# Patient Record
Sex: FEMALE | Race: WHITE | Employment: STUDENT | URBAN - METROPOLITAN AREA
[De-identification: names, ages, dates, MRNs, and addresses within clinical notes are randomized per-mention and may not be internally consistent; named-entity substitution may affect disease eponyms.]

---

## 2017-01-05 ENCOUNTER — HOSPITAL ENCOUNTER (OUTPATIENT)
Dept: LAB | Age: 22
Discharge: HOME | End: 2017-01-05
Payer: COMMERCIAL

## 2017-01-05 PROCEDURE — 88175 CYTOPATH C/V AUTO FLUID REDO: CPT | Performed by: OBSTETRICS & GYNECOLOGY

## 2018-04-18 PROBLEM — Z30.09 CONTRACEPTIVE EDUCATION: Status: ACTIVE | Noted: 2018-04-18

## 2018-04-19 PROBLEM — Z11.3 SCREEN FOR STD (SEXUALLY TRANSMITTED DISEASE): Status: ACTIVE | Noted: 2018-04-19

## 2018-04-19 PROBLEM — Z30.017 NEXPLANON INSERTION: Status: ACTIVE | Noted: 2018-04-19

## 2018-04-19 PROBLEM — Z30.09 CONTRACEPTIVE EDUCATION: Status: RESOLVED | Noted: 2018-04-18 | Resolved: 2018-04-19

## 2018-04-19 PROBLEM — Z30.46 NEXPLANON REMOVAL: Status: ACTIVE | Noted: 2018-04-19

## 2018-08-13 PROBLEM — N64.4 BREAST PAIN: Status: ACTIVE | Noted: 2018-08-13

## 2018-10-12 PROBLEM — Z30.46 NEXPLANON REMOVAL: Status: RESOLVED | Noted: 2018-04-19 | Resolved: 2018-10-12

## 2018-10-12 PROBLEM — Z30.017 NEXPLANON INSERTION: Status: RESOLVED | Noted: 2018-04-19 | Resolved: 2018-10-12

## 2018-10-12 PROBLEM — N92.1 MENORRHAGIA WITH IRREGULAR CYCLE: Status: ACTIVE | Noted: 2018-10-12

## 2019-06-06 PROBLEM — Z01.419 ENCOUNTER FOR ANNUAL ROUTINE GYNECOLOGICAL EXAMINATION: Status: ACTIVE | Noted: 2019-06-06

## 2019-06-06 PROBLEM — Z11.3 SCREEN FOR STD (SEXUALLY TRANSMITTED DISEASE): Status: RESOLVED | Noted: 2018-04-19 | Resolved: 2019-06-06

## 2020-09-21 PROBLEM — N64.4 BREAST PAIN: Status: RESOLVED | Noted: 2018-08-13 | Resolved: 2020-09-21

## 2021-05-03 PROBLEM — Z30.430 ENCOUNTER FOR IUD INSERTION: Status: ACTIVE | Noted: 2021-05-03

## 2021-06-01 PROBLEM — Z30.431 IUD CHECK UP: Status: ACTIVE | Noted: 2021-06-01

## 2022-03-20 PROBLEM — Z30.430 ENCOUNTER FOR IUD INSERTION: Status: ACTIVE | Noted: 2021-05-03

## 2022-04-04 PROBLEM — Z01.419 ENCOUNTER FOR ANNUAL ROUTINE GYNECOLOGICAL EXAMINATION: Status: ACTIVE | Noted: 2022-04-04

## 2022-05-04 PROBLEM — Z01.419 ENCOUNTER FOR ANNUAL ROUTINE GYNECOLOGICAL EXAMINATION: Status: RESOLVED | Noted: 2022-04-04 | Resolved: 2022-05-04

## 2022-10-18 ENCOUNTER — OFFICE VISIT (OUTPATIENT)
Dept: OBGYN CLINIC | Age: 27
End: 2022-10-18
Payer: COMMERCIAL

## 2022-10-18 VITALS
WEIGHT: 165 LBS | SYSTOLIC BLOOD PRESSURE: 128 MMHG | HEIGHT: 67 IN | DIASTOLIC BLOOD PRESSURE: 82 MMHG | BODY MASS INDEX: 25.9 KG/M2

## 2022-10-18 DIAGNOSIS — N89.8 VAGINAL DISCHARGE: ICD-10-CM

## 2022-10-18 DIAGNOSIS — R10.2 PELVIC PAIN: Primary | ICD-10-CM

## 2022-10-18 LAB
BILIRUBIN, URINE, POC: NEGATIVE
BLOOD URINE, POC: NORMAL
GLUCOSE URINE, POC: NEGATIVE
HCG, PREGNANCY, URINE, POC: NEGATIVE
KETONES, URINE, POC: NEGATIVE
LEUKOCYTE ESTERASE, URINE, POC: NEGATIVE
NITRITE, URINE, POC: NEGATIVE
PH, URINE, POC: 8 (ref 4.6–8)
PROTEIN,URINE, POC: NEGATIVE
SPECIFIC GRAVITY, URINE, POC: 1.01 (ref 1–1.03)
URINALYSIS CLARITY, POC: CLEAR
URINALYSIS COLOR, POC: NORMAL
UROBILINOGEN, POC: NORMAL
VALID INTERNAL CONTROL, POC: YES

## 2022-10-18 PROCEDURE — 99213 OFFICE O/P EST LOW 20 MIN: CPT | Performed by: NURSE PRACTITIONER

## 2022-10-18 PROCEDURE — 81001 URINALYSIS AUTO W/SCOPE: CPT | Performed by: NURSE PRACTITIONER

## 2022-10-18 PROCEDURE — 81025 URINE PREGNANCY TEST: CPT | Performed by: NURSE PRACTITIONER

## 2022-10-18 NOTE — PROGRESS NOTES
Bita Marrufo is a 33 y.o. [place] who is here today for IUD check. Pt reports 3 days ago she started to experience pinching pain in lower pelvic area. Intermittent. Comes and goes. Yesterday was enough discomfort that she had to take an OTC NSAIDS. Has not had intercourse in over a week. Normally has white discharge on and off which is present. Denies dysuria, itching or odor. No LMP recorded. (Menstrual status: IUD). Past Medical History:   Diagnosis Date    Migraine     Migraines experienced in adolescence but not experienced since high school       Past Surgical History:   Procedure Laterality Date    TONSILLECTOMY         Family History   Problem Relation Age of Onset    Cancer Paternal Grandfather     No Known Problems Paternal Grandmother     No Known Problems Maternal Grandfather     Cancer Maternal Grandmother         gallbladder, lung     Heart Disease Father     Hypertension Father         High BP, 4 way bypass surgery in early 46s    No Known Problems Mother     Breast Cancer Neg Hx     Colon Cancer Neg Hx     Ovarian Cancer Neg Hx     Collagen Disease Neg Hx     Uterine Cancer Neg Hx        Social History     Socioeconomic History    Marital status:      Spouse name: Not on file    Number of children: Not on file    Years of education: Not on file    Highest education level: Not on file   Occupational History    Not on file   Tobacco Use    Smoking status: Never    Smokeless tobacco: Never   Substance and Sexual Activity    Alcohol use: Yes     Alcohol/week: 5.0 standard drinks     Types: 3 Glasses of wine, 2 Cans of beer per week     Comment: 2 drinks per outing (2/wk)/4x per outing on occasion    Drug use: No    Sexual activity: Yes     Partners: Male     Birth control/protection: I.U.D.    Other Topics Concern    Not on file   Social History Narrative    Abuse: Feels safe at home, no history of physical abuse, no history of sexual abuse     Social Determinants of Health Financial Resource Strain: Not on file   Food Insecurity: Not on file   Transportation Needs: Not on file   Physical Activity: Not on file   Stress: Not on file   Social Connections: Not on file   Intimate Partner Violence: Not on file   Housing Stability: Not on file           Objective:    Vitals:    10/18/22 1343   BP: 128/82   Site: Left Upper Arm   Position: Sitting   Weight: 165 lb (74.8 kg)   Height: 5' 7\" (1.702 m)         Constitutional:  well-developed, well-nourished, and in no distress. Mental: Alert and awake. Oriented to person/place/time. Able to follow commands    Eyes: EOM nl, Sclera nl, Ocular Discharge not visualized    HENT: Normocephalic and atraumatic. Mouth/Throat: Mucous membranes are moist    External Ears: nl    Neck: Normal range of motion. No masses visualized       Pulmonary: Effort normal. No visualized signs of difficulty breathing or respiratory distress    Pelvic Exam:       External: normal female genitalia without lesions or masses       Vagina: normal without lesions or masses, scant white discharge noted      Cervix: normal without lesions or masses, Kyleena strings at os short <1cm        Adnexa: normal bimanual exam without masses or fullness       Uterus: uterus is normal size, shape, consistency and nontender    Musculoskeletal: Normal range of motion. Normal gait with no signs of ataxia     Neurological: No Facial Asymmetry (Cranial nerve 7 motor function) No gaze palsy    Skin: No significant exanthematous lesions or discoloration noted on facial skin      Psych: Normal affect. No hallucinations              Assessment/Plan            Patient Active Problem List    Diagnosis Date Noted    Pelvic pain 10/18/2022     Priority: Medium     Assessment & Plan Note:     UPT negative, UA unremarkable except endometrial stripe 15mm,   Nuswab collected  IUD strings at os, short  Plan recheck of US in 3-4 weeks.  Pt to call sooner if symptoms return or worsen      IUD check up 06/01/2021     Overview Note:     noted        Encounter for IUD insertion 05/03/2021     Overview Note:     Sophia Buckle inserted 5/3/21           Problem List Items Addressed This Visit          Other    Pelvic pain - Primary     UPT negative, UA unremarkable except endometrial stripe 15mm,   Nuswab collected  IUD strings at os, short  Plan recheck of US in 3-4 weeks. Pt to call sooner if symptoms return or worsen         Relevant Orders    AMB POC URINALYSIS DIP STICK AUTO W/ MICRO (Completed)    AMB POC URINE PREGNANCY TEST, VISUAL COLOR COMPARISON (Completed)    Nuswab Vaginitis Plus (VG+)     Other Visit Diagnoses       Vaginal discharge        Relevant Orders    Nuswab Vaginitis Plus (VG+)            Orders Placed This Encounter   Procedures    Nuswab Vaginitis Plus (VG+)    AMB POC URINALYSIS DIP STICK AUTO W/ MICRO    AMB POC URINE PREGNANCY TEST, VISUAL COLOR COMPARISON       Outpatient Encounter Medications as of 10/18/2022   Medication Sig Dispense Refill    Cetirizine HCl 10 MG CAPS Take by mouth      Levonorgestrel [facility]) IUD 19.5 mg 1 each by IntraUTERine route once       No facility-administered encounter medications on file as of 10/18/2022.                Crystal Boo NP, APRN - CNP 10/18/22 2:35 PM

## 2022-10-18 NOTE — ASSESSMENT & PLAN NOTE
UPT negative, UA unremarkable except endometrial stripe 15mm,   Nuswab collected  IUD strings at os, short  Plan recheck of US in 3-4 weeks.  Pt to call sooner if symptoms return or worsen

## 2022-10-18 NOTE — PROGRESS NOTES
I have reviewed the patient's visit today including history, exam and assessment by SUNIL Dixon. I agree with treatment/plan as above.     Carina Leyva MD  2:53 PM  10/18/22

## 2022-10-21 LAB
A VAGINAE DNA VAG QL NAA+PROBE: NORMAL SCORE
BVAB2 DNA VAG QL NAA+PROBE: NORMAL SCORE
C ALBICANS DNA VAG QL NAA+PROBE: NEGATIVE
C GLABRATA DNA VAG QL NAA+PROBE: NEGATIVE
C TRACH RRNA SPEC QL NAA+PROBE: NEGATIVE
MEGA1 DNA VAG QL NAA+PROBE: NORMAL SCORE
N GONORRHOEA RRNA SPEC QL NAA+PROBE: NEGATIVE
SPECIMEN SOURCE: NORMAL
T VAGINALIS RRNA SPEC QL NAA+PROBE: NEGATIVE

## 2022-11-15 ENCOUNTER — OFFICE VISIT (OUTPATIENT)
Dept: OBGYN CLINIC | Age: 27
End: 2022-11-15
Payer: COMMERCIAL

## 2022-11-15 VITALS
DIASTOLIC BLOOD PRESSURE: 68 MMHG | BODY MASS INDEX: 25.9 KG/M2 | SYSTOLIC BLOOD PRESSURE: 116 MMHG | HEIGHT: 67 IN | WEIGHT: 165 LBS

## 2022-11-15 DIAGNOSIS — R10.2 PELVIC PAIN: Primary | ICD-10-CM

## 2022-11-15 PROCEDURE — 99212 OFFICE O/P EST SF 10 MIN: CPT | Performed by: NURSE PRACTITIONER

## 2022-11-15 PROCEDURE — 76830 TRANSVAGINAL US NON-OB: CPT | Performed by: NURSE PRACTITIONER

## 2022-11-15 NOTE — PROGRESS NOTES
Sandeep Iraheta is a 33 y.o. [place] who is here today for US. Seen 10/18/22 with the following concerns  Greta Sosa is here today for IUD check. Pt reports 3 days ago she started to experience pinching pain in lower pelvic area. Intermittent. Comes and goes. Yesterday was enough discomfort that she had to take an OTC NSAIDS. Has not had intercourse in over a week. Normally has white discharge on and off which is present. Denies dysuria, itching or odor. \"      Patient's last menstrual period was 10/19/2022 (exact date). Past Medical History:   Diagnosis Date    Migraine     Migraines experienced in adolescence but not experienced since high school       Past Surgical History:   Procedure Laterality Date    TONSILLECTOMY         Family History   Problem Relation Age of Onset    Cancer Paternal Grandfather     No Known Problems Paternal Grandmother     No Known Problems Maternal Grandfather     Cancer Maternal Grandmother         gallbladder, lung     Heart Disease Father     Hypertension Father         High BP, 4 way bypass surgery in early 46s    No Known Problems Mother     Breast Cancer Neg Hx     Colon Cancer Neg Hx     Ovarian Cancer Neg Hx     Collagen Disease Neg Hx     Uterine Cancer Neg Hx        Social History     Socioeconomic History    Marital status:      Spouse name: Not on file    Number of children: Not on file    Years of education: Not on file    Highest education level: Not on file   Occupational History    Not on file   Tobacco Use    Smoking status: Never    Smokeless tobacco: Never   Substance and Sexual Activity    Alcohol use: Yes     Alcohol/week: 5.0 standard drinks     Types: 3 Glasses of wine, 2 Cans of beer per week     Comment: 2 drinks per outing (2/wk)/4x per outing on occasion    Drug use: No    Sexual activity: Yes     Partners: Male     Birth control/protection: I.U.D.    Other Topics Concern    Not on file   Social History Narrative    Abuse: Feels safe at home, no history of physical abuse, no history of sexual abuse     Social Determinants of Health     Financial Resource Strain: Not on file   Food Insecurity: Not on file   Transportation Needs: Not on file   Physical Activity: Not on file   Stress: Not on file   Social Connections: Not on file   Intimate Partner Violence: Not on file   Housing Stability: Not on file           Objective:    Vitals:    11/15/22 0856   BP: 116/68   Site: Left Upper Arm   Position: Sitting   Weight: 165 lb (74.8 kg)   Height: 5' 7\" (1.702 m)         Constitutional:  well-developed, well-nourished, and in no distress. Mental: Alert and awake. Oriented to person/place/time. Able to follow commands    Eyes: EOM nl, Sclera nl, Ocular Discharge not visualized    HENT: Normocephalic and atraumatic. Mouth/Throat: Mucous membranes are moist    External Ears: nl    Neck: Normal range of motion. No masses visualized       Pulmonary: Effort normal. No visualized signs of difficulty breathing or respiratory distress    Musculoskeletal: Normal range of motion. Normal gait with no signs of ataxia     Neurological: No Facial Asymmetry (Cranial nerve 7 motor function) No gaze palsy    Skin: No significant exanthematous lesions or discoloration noted on facial skin      Psych: Normal affect.  No hallucinations              Assessment/Plan            Patient Active Problem List    Diagnosis Date Noted    Pelvic pain 10/18/2022     Priority: Medium     Assessment & Plan Note:     Pain resolved  US today - IUD in correct position  RTO April 2023 for annual exam      IUD check up 06/01/2021     Overview Note:     noted        Encounter for IUD insertion 05/03/2021     Overview Note:     Kristin Forman inserted 5/3/21           Problem List Items Addressed This Visit          Other    Pelvic pain - Primary     Pain resolved  US today - IUD in correct position  RTO April 2023 for annual exam         Relevant Orders    AMB POC US, TRANSVAGINAL (Completed)       Orders Placed This Encounter   Procedures    AMB POC US, TRANSVAGINAL       Outpatient Encounter Medications as of 11/15/2022   Medication Sig Dispense Refill    Cetirizine HCl 10 MG CAPS Take by mouth      Levonorgestrel [facility]) IUD 19.5 mg 1 each by IntraUTERine route once       No facility-administered encounter medications on file as of 11/15/2022.                Caroline Carrasco NP, APRN - CNP 11/15/22 9:10 AM

## 2022-11-15 NOTE — PROGRESS NOTES
I have reviewed the patient's visit today including history, exam and assessment by SUNIL Guzman. I agree with treatment/plan as above.     Gaby Mills MD  9:24 AM  11/15/22

## 2022-11-15 NOTE — PROGRESS NOTES
Pt comes in today for follow up from cramping and could not feel IUD string. LAST PAP:  04/04/2022 Negative     LAST MAMMO:  Never    LMP:  Patient's last menstrual period was 10/19/2022 (exact date).     BIRTH CONTROL:  IUD    TOBACCO USE:  No    FAMILY HISTORY OF:   Breast Cancer:  No   Ovarian Cancer:  No   Uterine Cancer:  No   Colon Cancer:  No    Vitals:    11/15/22 0856   BP: 116/68   Site: Left Upper Arm   Position: Sitting   Weight: 165 lb (74.8 kg)   Height: 5' 7\" (1.702 m)        Oscar Phillips MA  11/15/22  9:00 AM